# Patient Record
Sex: MALE | Race: WHITE
[De-identification: names, ages, dates, MRNs, and addresses within clinical notes are randomized per-mention and may not be internally consistent; named-entity substitution may affect disease eponyms.]

---

## 2020-04-21 ENCOUNTER — HOSPITAL ENCOUNTER (EMERGENCY)
Dept: HOSPITAL 47 - EC | Age: 30
Discharge: HOME | End: 2020-04-21
Payer: COMMERCIAL

## 2020-04-21 VITALS
TEMPERATURE: 98 F | SYSTOLIC BLOOD PRESSURE: 138 MMHG | RESPIRATION RATE: 18 BRPM | HEART RATE: 62 BPM | DIASTOLIC BLOOD PRESSURE: 77 MMHG

## 2020-04-21 DIAGNOSIS — Z03.818: Primary | ICD-10-CM

## 2020-04-21 DIAGNOSIS — J06.9: ICD-10-CM

## 2020-04-21 PROCEDURE — 99283 EMERGENCY DEPT VISIT LOW MDM: CPT

## 2020-04-21 PROCEDURE — 87635 SARS-COV-2 COVID-19 AMP PRB: CPT

## 2020-04-21 PROCEDURE — 71046 X-RAY EXAM CHEST 2 VIEWS: CPT

## 2020-04-21 NOTE — ED
General Adult HPI





- General


Chief complaint: Upper Respiratory Infection


Stated complaint: Fever, cough


Time Seen by Provider: 04/21/20 11:43


Source: patient, RN notes reviewed


Mode of arrival: ambulatory


Limitations: no limitations





- History of Present Illness


Initial comments: 





This a 29-year-old male presents emergency Department chief complaint of fever 

cough congestion.  Patient states that symptoms started last 2 days.  Patient 

states that he works as a  states that his partner tested positive 

yesterday they were together all weekend.  Patient states that he to take some 

Tylenol prior arrival for his fever.  Patient states he otherwise does not have 

any significant complaints denies any chest pain, shortness breath, headache.  

He does complain of mild scratchy throat.  No abdominal pain.





- Related Data


                                    Allergies











Allergy/AdvReac Type Severity Reaction Status Date / Time


 


No Known Allergies Allergy   Verified 04/21/20 11:43














Review of Systems


ROS Statement: 


Those systems with pertinent positive or pertinent negative responses have been 

documented in the HPI.





ROS Other: All systems not noted in ROS Statement are negative.





Past Medical History


Past Medical History: No Reported History


History of Any Multi-Drug Resistant Organisms: None Reported


Past Surgical History: Appendectomy, Orthopedic Surgery


Additional Past Surgical History / Comment(s): knee, bunion, eye


Past Psychological History: No Psychological Hx Reported


Smoking Status: Never smoker


Past Alcohol Use History: None Reported


Past Drug Use History: None Reported





General Exam


Limitations: no limitations


General appearance: alert, in no apparent distress


Head exam: Present: atraumatic, normocephalic, normal inspection


Eye exam: Present: normal appearance, PERRL, EOMI.  Absent: scleral icterus, 

conjunctival injection, periorbital swelling


ENT exam: Present: normal exam, normal oropharynx, mucous membranes moist


Neck exam: Present: normal inspection, full ROM.  Absent: tenderness, 

meningismus, lymphadenopathy


Respiratory exam: Present: normal lung sounds bilaterally.  Absent: respiratory 

distress, wheezes, rales, rhonchi, stridor


Cardiovascular Exam: Present: regular rate, normal rhythm, normal heart sounds. 

Absent: systolic murmur, diastolic murmur, rubs, gallop, clicks


GI/Abdominal exam: Present: soft, normal bowel sounds.  Absent: distended, 

tenderness, guarding, rebound, rigid


Neurological exam: Present: alert, oriented X3, CN II-XII intact, reflexes 

normal.  Absent: motor sensory deficit


Skin exam: Present: warm, dry, intact, normal color.  Absent: rash





Course


                                   Vital Signs











  04/21/20





  11:39


 


Temperature 98 F


 


Pulse Rate 62


 


Respiratory 18





Rate 


 


Blood Pressure 138/77


 


O2 Sat by Pulse 99





Oximetry 














Medical Decision Making





- Medical Decision Making





29-year-old male presented for fever cough congestion.  Patient chest x-ray 

shows no similar abnormality possible, bronchitis.  Patient vitals are 

stable.COVID is negative at this time.  Patient does have a known exposure hand 

should Self quarantining at this time.  Patient will be discharged in stable 

condition return parameters discussed.





- Lab Data


                                   Lab Results











  04/21/20 Range/Units





  12:00 


 


Coronavirus (PCR)  Not Detected  (Not Detectd)  














Disposition


Clinical Impression: 


 Acute upper respiratory infection





Disposition: HOME SELF-CARE


Condition: Stable


Instructions (If sedation given, give patient instructions):  Upper Respiratory 

Infection (ED)


Additional Instructions: 


Please return to the Emergency Department if symptoms worsen or any other 

concerns.


Is patient prescribed a controlled substance at d/c from ED?: No


Referrals: 


Kathia Sevilla DO [Primary Care Provider] - 1-2 days


Time of Disposition: 13:05

## 2020-04-21 NOTE — XR
EXAMINATION TYPE: XR chest 2V

 

DATE OF EXAM: 4/21/2020

 

COMPARISON: NONE

 

HISTORY: Cough and fever

 

TECHNIQUE:  Frontal and lateral views of the chest are obtained.

 

FINDINGS:  Mild central peribronchial cuffing on the lateral view. There is no focal air space opacit
y, pleural effusion, or pneumothorax seen.  The cardiac silhouette size is within normal limits.   Th
e osseous structures are intact.

 

IMPRESSION:  No focal consolidation. Mild central peribronchial cuffing that can be seen in reactive 
or infectious airway disease. Consider bronchitis.

## 2020-11-29 ENCOUNTER — HOSPITAL ENCOUNTER (OUTPATIENT)
Dept: HOSPITAL 47 - LABMAIN | Age: 30
Discharge: HOME | End: 2020-11-29
Attending: EMERGENCY MEDICINE
Payer: COMMERCIAL

## 2020-11-29 DIAGNOSIS — Z20.828: Primary | ICD-10-CM

## 2025-01-16 ENCOUNTER — HOSPITAL ENCOUNTER (OUTPATIENT)
Dept: HOSPITAL 47 - RADMRIMAIN | Age: 35
Discharge: HOME | End: 2025-01-16
Attending: FAMILY MEDICINE
Payer: COMMERCIAL

## 2025-01-16 DIAGNOSIS — R60.9: Primary | ICD-10-CM

## 2025-01-17 NOTE — MR
EXAMINATION TYPE: MR wrist LT wo con

 

DATE OF EXAM: 1/16/2025 7:24 PM

 

COMPARISON: None.

 

CLINICAL INDICATION: Male, 34 years old with history of M25.532 PAIN IN LEFT WRIST; [facility], Left wrist p
ain x6 months - No known cause

 

TECHNIQUE: T1 axial, proton density axial fat sat, T1 coronal, T2 coronal thin slice 3-D gradient rec
alled echo, T1 sagittal, T2 inversion recovery coronal and proton density fat sat coronal were perfor
med.  No Gadolinium given.

IV Contrast: mL (None, if empty)

 

FINDINGS: 

Joint spaces and alignment: Normal

Joint/bursal fluid:                   Normal

Articular cartilage:                 Normal

 

Flexor retinaculum:                Intact

Median nerve:                        Intact

Flexor tendons:                      Intact

Extensor tendons:                  Intact

Abductors/adductor tendons: Intact

 

Intrinsic carpal ligaments:  Intact

Extrinsic carpal ligaments: Intact

Triangular fibrocartilage complex: Intact, poorly visualized due to none

 

Thickening.

 

Neurovascular structures: Normal

 

Marrow:        Mild bony edema in the ulna. There is ulnar positive variance present with the ulna ex
tending thought to be approximately 3 mm past the contiguous surfaces of the radius.

Soft tissues: Normal

 

IMPRESSION:

Positive ulnar variance with mild bony edema and the distal ulna with subchondral cystic change. The 
skin collateral and ulnar impaction syndrome's and thinning of the triangular fibrocartilage complex.


 

[facility], Workstation: [id number], 1/17/2025 10:02 AM